# Patient Record
Sex: FEMALE | URBAN - METROPOLITAN AREA
[De-identification: names, ages, dates, MRNs, and addresses within clinical notes are randomized per-mention and may not be internally consistent; named-entity substitution may affect disease eponyms.]

---

## 2020-07-27 ENCOUNTER — APPOINTMENT (OUTPATIENT)
Dept: URBAN - METROPOLITAN AREA CLINIC 273 | Age: 30
Setting detail: DERMATOLOGY
End: 2020-07-27

## 2020-07-27 DIAGNOSIS — L30.9 DERMATITIS, UNSPECIFIED: ICD-10-CM

## 2020-07-27 PROCEDURE — OTHER COUNSELING: OTHER

## 2020-07-27 PROCEDURE — OTHER REASSURANCE: OTHER

## 2020-07-27 PROCEDURE — OTHER PRESCRIPTION: OTHER

## 2020-07-27 PROCEDURE — OTHER PRESCRIPTION MEDICATION MANAGEMENT: OTHER

## 2020-07-27 PROCEDURE — 99202 OFFICE O/P NEW SF 15 MIN: CPT

## 2020-07-27 RX ORDER — CLINDAMYCIN PHOSPHATE 10 MG/ML
LOTION TOPICAL
Qty: 1 | Refills: 3 | Status: ERX | COMMUNITY
Start: 2020-07-27

## 2020-07-27 ASSESSMENT — LOCATION ZONE DERM: LOCATION ZONE: FACE

## 2020-07-27 ASSESSMENT — LOCATION DETAILED DESCRIPTION DERM: LOCATION DETAILED: RIGHT CENTRAL MALAR CHEEK

## 2020-07-27 ASSESSMENT — LOCATION SIMPLE DESCRIPTION DERM: LOCATION SIMPLE: RIGHT CHEEK

## 2020-07-27 NOTE — PROCEDURE: PRESCRIPTION MEDICATION MANAGEMENT
Detail Level: Zone
Initiate Treatment: Clindamycin 1% lotion - apply twice a day to a on face
Render In Strict Bullet Format?: No
Otc Regimen: CeraVe cleanser and lotion

## 2020-07-27 NOTE — HPI: RASH
How Severe Is Your Rash?: mild
Is This A New Presentation, Or A Follow-Up?: Rash
Additional History: Patient states the area under right eye gets slightly puffy and red and is mildly tender. Goes away on its own. Not particularly itchy